# Patient Record
Sex: MALE | Race: WHITE | NOT HISPANIC OR LATINO | Employment: OTHER | URBAN - METROPOLITAN AREA
[De-identification: names, ages, dates, MRNs, and addresses within clinical notes are randomized per-mention and may not be internally consistent; named-entity substitution may affect disease eponyms.]

---

## 2017-04-04 DIAGNOSIS — I25.10 ATHEROSCLEROSIS OF NATIVE CORONARY ARTERY OF NATIVE HEART WITHOUT ANGINA PECTORIS: ICD-10-CM

## 2017-04-04 RX ORDER — FUROSEMIDE 40 MG/1
40 TABLET ORAL DAILY
Qty: 90 TAB | Refills: 2 | Status: SHIPPED | OUTPATIENT
Start: 2017-04-04 | End: 2018-06-15 | Stop reason: SDUPTHER

## 2017-04-11 DIAGNOSIS — E78.5 HYPERLIPIDEMIA, UNSPECIFIED HYPERLIPIDEMIA TYPE: ICD-10-CM

## 2017-04-11 RX ORDER — SIMVASTATIN 20 MG
20 TABLET ORAL EVERY EVENING
Qty: 90 TAB | Refills: 2 | Status: SHIPPED | OUTPATIENT
Start: 2017-04-11 | End: 2017-10-16 | Stop reason: SDUPTHER

## 2017-04-12 ENCOUNTER — TELEPHONE (OUTPATIENT)
Dept: CARDIOLOGY | Facility: MEDICAL CENTER | Age: 82
End: 2017-04-12

## 2017-05-18 ENCOUNTER — HOSPITAL ENCOUNTER (OUTPATIENT)
Dept: LAB | Facility: MEDICAL CENTER | Age: 82
End: 2017-05-18
Attending: INTERNAL MEDICINE
Payer: MEDICARE

## 2017-05-18 DIAGNOSIS — Z79.899 HIGH RISK MEDICATION USE: ICD-10-CM

## 2017-05-18 DIAGNOSIS — N18.4 CKD (CHRONIC KIDNEY DISEASE), STAGE 4 (SEVERE): ICD-10-CM

## 2017-05-18 LAB
ANION GAP SERPL CALC-SCNC: 8 MMOL/L (ref 0–11.9)
BUN SERPL-MCNC: 33 MG/DL (ref 8–22)
CALCIUM SERPL-MCNC: 9.5 MG/DL (ref 8.5–10.5)
CHLORIDE SERPL-SCNC: 105 MMOL/L (ref 96–112)
CO2 SERPL-SCNC: 24 MMOL/L (ref 20–33)
CREAT SERPL-MCNC: 1.6 MG/DL (ref 0.5–1.4)
GFR SERPL CREATININE-BSD FRML MDRD: 41 ML/MIN/1.73 M 2
GLUCOSE SERPL-MCNC: 158 MG/DL (ref 65–99)
POTASSIUM SERPL-SCNC: 4.6 MMOL/L (ref 3.6–5.5)
SODIUM SERPL-SCNC: 137 MMOL/L (ref 135–145)

## 2017-05-18 PROCEDURE — 80048 BASIC METABOLIC PNL TOTAL CA: CPT

## 2017-05-18 PROCEDURE — 36415 COLL VENOUS BLD VENIPUNCTURE: CPT

## 2017-05-19 ENCOUNTER — TELEPHONE (OUTPATIENT)
Dept: CARDIOLOGY | Facility: MEDICAL CENTER | Age: 82
End: 2017-05-19

## 2017-05-19 NOTE — TELEPHONE ENCOUNTER
Called patient and advised him of stable lab results. Patient is thankful for the good news.    ETELVINA AN

## 2017-05-19 NOTE — TELEPHONE ENCOUNTER
----- Message from Demarco Ochoa M.D. sent at 5/19/2017 12:56 PM PDT -----  Labs look stable, please let him know     Thank you

## 2017-06-02 ENCOUNTER — OFFICE VISIT (OUTPATIENT)
Dept: CARDIOLOGY | Facility: PHYSICIAN GROUP | Age: 82
End: 2017-06-02
Payer: MEDICARE

## 2017-06-02 VITALS
DIASTOLIC BLOOD PRESSURE: 66 MMHG | HEIGHT: 65 IN | BODY MASS INDEX: 27.99 KG/M2 | SYSTOLIC BLOOD PRESSURE: 118 MMHG | OXYGEN SATURATION: 95 % | WEIGHT: 168 LBS | HEART RATE: 65 BPM

## 2017-06-02 DIAGNOSIS — I50.42 CHF (CONGESTIVE HEART FAILURE), NYHA CLASS II, CHRONIC, COMBINED (HCC): ICD-10-CM

## 2017-06-02 DIAGNOSIS — I25.83 CORONARY ARTERY DISEASE DUE TO LIPID RICH PLAQUE: ICD-10-CM

## 2017-06-02 DIAGNOSIS — I50.22 CHRONIC SYSTOLIC CONGESTIVE HEART FAILURE (HCC): Chronic | ICD-10-CM

## 2017-06-02 DIAGNOSIS — Z98.890 S/P CARDIAC CATHETERIZATION: Chronic | ICD-10-CM

## 2017-06-02 DIAGNOSIS — I25.2 OLD MYOCARDIAL INFARCTION: Chronic | ICD-10-CM

## 2017-06-02 DIAGNOSIS — I25.10 CORONARY ARTERY DISEASE DUE TO LIPID RICH PLAQUE: ICD-10-CM

## 2017-06-02 PROCEDURE — G8432 DEP SCR NOT DOC, RNG: HCPCS | Performed by: INTERNAL MEDICINE

## 2017-06-02 PROCEDURE — 1036F TOBACCO NON-USER: CPT | Performed by: INTERNAL MEDICINE

## 2017-06-02 PROCEDURE — G8598 ASA/ANTIPLAT THER USED: HCPCS | Performed by: INTERNAL MEDICINE

## 2017-06-02 PROCEDURE — G8419 CALC BMI OUT NRM PARAM NOF/U: HCPCS | Performed by: INTERNAL MEDICINE

## 2017-06-02 PROCEDURE — 99214 OFFICE O/P EST MOD 30 MIN: CPT | Performed by: INTERNAL MEDICINE

## 2017-06-02 PROCEDURE — 1101F PT FALLS ASSESS-DOCD LE1/YR: CPT | Mod: 8P | Performed by: INTERNAL MEDICINE

## 2017-06-02 PROCEDURE — 4040F PNEUMOC VAC/ADMIN/RCVD: CPT | Mod: 8P | Performed by: INTERNAL MEDICINE

## 2017-06-02 RX ORDER — CARVEDILOL 6.25 MG/1
6.25 TABLET ORAL 2 TIMES DAILY WITH MEALS
Qty: 180 TAB | Refills: 3 | Status: SHIPPED | OUTPATIENT
Start: 2017-06-02 | End: 2017-09-05 | Stop reason: SDUPTHER

## 2017-06-02 ASSESSMENT — ENCOUNTER SYMPTOMS
COUGH: 0
SORE THROAT: 0
PND: 0
FOCAL WEAKNESS: 0
PALPITATIONS: 0
CLAUDICATION: 0
SHORTNESS OF BREATH: 0
BACK PAIN: 1
HEARTBURN: 1
NAUSEA: 0
HEADACHES: 0
FEVER: 0
DIZZINESS: 0
BRUISES/BLEEDS EASILY: 0
ABDOMINAL PAIN: 0
LOSS OF CONSCIOUSNESS: 0
CHILLS: 0
WEAKNESS: 0
FALLS: 0
BLURRED VISION: 0

## 2017-06-03 NOTE — PROGRESS NOTES
Subjective:   Mansoor Case is a 86 y.o. male who presents today for follow-up of his history of coronary disease with ischemic cardiomyopathy and reduced ejection fraction    He has been doing okay he worries that the carvedilol contributes to dyspepsia    Past Medical History   Diagnosis Date   • Arthropathy 4/5/2012   • CAD (coronary artery disease), native coronary artery 4/5/2012   • Dyslipidemia 4/5/2012   • Old myocardial infarction 4/5/2012   • CHF (congestive heart failure) (CMS-HCC) 4/5/2012   • Renal insufficiency 4/5/2012   • S/P cardiac catheterization    • Chronic systolic congestive heart failure (HCC) - ischemic myopathy with heart attack in 2003 and 2015 with an ejection fraction of 15-20% 4/5/2012            History reviewed. No pertinent past surgical history.  History reviewed. No pertinent family history.  History   Smoking status   • Former Smoker   • Quit date: 01/01/2003   Smokeless tobacco   • Never Used     No Known Allergies  Outpatient Encounter Prescriptions as of 6/2/2017   Medication Sig Dispense Refill   • carvedilol (COREG) 6.25 MG Tab Take 1 Tab by mouth 2 times a day, with meals. 180 Tab 3   • simvastatin (ZOCOR) 20 MG Tab Take 1 Tab by mouth every evening. 90 Tab 2   • furosemide (LASIX) 40 MG Tab Take 1 Tab by mouth every day. 90 Tab 2   • aspirin EC (ECOTRIN) 81 MG Tablet Delayed Response Take 81 mg by mouth every day.     • spironolactone (ALDACTONE) 25 MG Tab Take 1 Tab by mouth every day. (Patient taking differently: Take 25 mg by mouth every day. Alternating every other day with Lasix) 90 Tab 3   • potassium chloride ER (KLOR-CON) 10 MEQ tablet Take 1 Tab by mouth every day. (Patient taking differently: Take 10 mEq by mouth. Take with Lasix every other day) 90 Tab 3   • lisinopril (PRINIVIL) 10 MG Tab Take 1 Tab by mouth every day. 90 Tab 3   • nitroglycerin (NITROSTAT) 0.4 MG SL Tab Place 1 Tab under tongue as needed for Chest Pain. every 5 minutes for up to 3 doses, call 911  "if pain persists. 25 Tab 3   • [DISCONTINUED] carvedilol (COREG) 12.5 MG Tab Take 1 Tab by mouth 2 times a day, with meals. 180 Tab 3   • [DISCONTINUED] apixaban (ELIQUIS) 2.5mg Tab Take 2.5 mg by mouth 2 Times a Day.       No facility-administered encounter medications on file as of 6/2/2017.     Review of Systems   Constitutional: Negative for fever and chills.   HENT: Negative for sore throat.    Eyes: Negative for blurred vision.   Respiratory: Negative for cough and shortness of breath.    Cardiovascular: Negative for chest pain, palpitations, claudication, leg swelling and PND.   Gastrointestinal: Positive for heartburn. Negative for nausea and abdominal pain.   Musculoskeletal: Positive for back pain and joint pain. Negative for falls.   Skin: Negative for rash.   Neurological: Negative for dizziness, focal weakness, loss of consciousness, weakness and headaches.   Endo/Heme/Allergies: Does not bruise/bleed easily.        Objective:   /66 mmHg  Pulse 65  Ht 1.651 m (5' 5\")  Wt 76.204 kg (168 lb)  BMI 27.96 kg/m2  SpO2 95%    Physical Exam   Constitutional: No distress.   HENT:   Mouth/Throat: Oropharynx is clear and moist.   Eyes: No scleral icterus.   Neck: Neck supple. No JVD present.   Cardiovascular: Normal rate, regular rhythm, normal heart sounds and intact distal pulses.  Exam reveals no gallop and no friction rub.    No murmur heard.  Pulmonary/Chest: Effort normal. He has no rales.   Abdominal: Soft. Bowel sounds are normal. There is no tenderness.   Musculoskeletal: He exhibits no edema.   Neurological: He is alert.   Skin: No rash noted. He is not diaphoretic.   Psychiatric: He has a normal mood and affect.     Recent lab work looks great with mild chronic kidney disease    Assessment:     1. Old myocardial infarction - 2003 likely RCA, 2016 NSTEMI      2. Coronary artery disease due to lipid rich plaque     3. S/P cardiac catheterization - 5/2016 in setting of acute MI with LBBB found " to have RCA  and mild disease in LAD and LCx     4. Chronic systolic congestive heart failure (HCC) - ischemic myopathy with heart attack in 2003 and 2016 with an ejection fraction of 15-20%     5. CHF (congestive heart failure), NYHA class II, chronic, combined (CMS-HCC)  carvedilol (COREG) 6.25 MG Tab       Medical Decision Making:  Today's Assessment / Status / Plan:     It was my pleasure to meet with Mr. Dow.    We will try to reduce his carvedilol to see if this helps with his dyspepsia not and he should continue on 6.25 twice a day    I also counseled him on appropriate diet for her gastroesophageal reflux disease    I will see Mr. Dow back in 1 year time and encouraged him to follow up with us over the phone or e-mail using my MyChart as issues arise.    It is my pleasure to participate in the care of Mr. Dow.  Please do not hesitate to contact me with questions or concerns.    Demarco Ochoa MD PhD FAC  Cardiologist [facility]

## 2017-07-05 DIAGNOSIS — I50.32 CHRONIC DIASTOLIC CONGESTIVE HEART FAILURE (HCC): ICD-10-CM

## 2017-07-06 RX ORDER — POTASSIUM CHLORIDE 750 MG/1
10 TABLET, FILM COATED, EXTENDED RELEASE ORAL DAILY
Qty: 90 TAB | Refills: 3 | OUTPATIENT
Start: 2017-07-06 | End: 2018-06-15 | Stop reason: SDUPTHER

## 2017-09-05 ENCOUNTER — TELEPHONE (OUTPATIENT)
Dept: CARDIOLOGY | Facility: MEDICAL CENTER | Age: 82
End: 2017-09-05

## 2017-09-05 DIAGNOSIS — I50.42 CHF (CONGESTIVE HEART FAILURE), NYHA CLASS II, CHRONIC, COMBINED (HCC): ICD-10-CM

## 2017-09-05 RX ORDER — LISINOPRIL 5 MG/1
5 TABLET ORAL DAILY
Qty: 90 TAB | Refills: 3 | Status: SHIPPED | OUTPATIENT
Start: 2017-09-05 | End: 2018-05-29 | Stop reason: SDUPTHER

## 2017-09-05 RX ORDER — CARVEDILOL 3.12 MG/1
3.12 TABLET ORAL 2 TIMES DAILY WITH MEALS
Qty: 180 TAB | Refills: 3 | Status: SHIPPED | OUTPATIENT
Start: 2017-09-05 | End: 2018-06-15 | Stop reason: SDUPTHER

## 2017-09-05 NOTE — TELEPHONE ENCOUNTER
Annette Ko R.N.   Phone Number: [phone]             CW/casey     Pt calling to report he had a 4th heart attack 9/1 and was hospitalized at [facility].  Pt wants to talk with CW about it.   Please have CW call pt at [phone].      Spoke with pt, pt reports was just recently admitted in [facility] for SOB, he was there x 4days and upon discharge they adjusted his meds as follows:      Coreg 3.125mg BID    Lasix 40mg take 1 1/2 tab    Lisinopril 5mg    Aspirin 325mg  Pt reports BP went down to 70s range while in the hospital but has been in the 120s since then  Pt requesting to talk directly to MD Whitfield and to hear from him directly. Pt don't want to set up Follow up but just wants to talk to him    Forwarded to MD Whitfield

## 2017-09-06 NOTE — TELEPHONE ENCOUNTER
Called pt he reports in addition to [facility] for dyspnea likely felt to have heart failure he believes according to this skeletally thought he was 5 pounds up on his home scale he doesn't believe that he really is.  He seems overall to reduce his carvedilol and lisinopril and increased his Lasix to 60 mg daily, on review he had not been taking 40 mg every day    Carvedilol 3.125 BID  Lisinopril 5 daily   Lasix 40 mg daily    Aspirin 81  Spironolactone 25  Weight at home 160-162    Lab work in a week    It is my pleasure to participate in the care of Mr. Dow.  Please do not hesitate to contact me with questions or concerns.    Demarco Ochoa MD PhD FACC  Cardiologist [facility]

## 2017-09-11 DIAGNOSIS — I50.42 CHF (CONGESTIVE HEART FAILURE), NYHA CLASS II, CHRONIC, COMBINED (HCC): ICD-10-CM

## 2017-09-12 RX ORDER — SPIRONOLACTONE 25 MG/1
25 TABLET ORAL DAILY
Qty: 90 TAB | Refills: 3 | Status: SHIPPED | OUTPATIENT
Start: 2017-09-12 | End: 2017-09-23

## 2017-09-22 ENCOUNTER — HOSPITAL ENCOUNTER (OUTPATIENT)
Dept: LAB | Facility: MEDICAL CENTER | Age: 82
End: 2017-09-22
Attending: INTERNAL MEDICINE
Payer: MEDICARE

## 2017-09-22 LAB
ANION GAP SERPL CALC-SCNC: 11 MMOL/L (ref 0–11.9)
BUN SERPL-MCNC: 59 MG/DL (ref 8–22)
CALCIUM SERPL-MCNC: 9.6 MG/DL (ref 8.5–10.5)
CHLORIDE SERPL-SCNC: 101 MMOL/L (ref 96–112)
CO2 SERPL-SCNC: 22 MMOL/L (ref 20–33)
CREAT SERPL-MCNC: 2.28 MG/DL (ref 0.5–1.4)
GFR SERPL CREATININE-BSD FRML MDRD: 27 ML/MIN/1.73 M 2
GLUCOSE SERPL-MCNC: 134 MG/DL (ref 65–99)
POTASSIUM SERPL-SCNC: 4.8 MMOL/L (ref 3.6–5.5)
SODIUM SERPL-SCNC: 134 MMOL/L (ref 135–145)

## 2017-09-22 PROCEDURE — 80048 BASIC METABOLIC PNL TOTAL CA: CPT

## 2017-09-22 PROCEDURE — 36415 COLL VENOUS BLD VENIPUNCTURE: CPT

## 2017-09-25 ENCOUNTER — TELEPHONE (OUTPATIENT)
Dept: CARDIOLOGY | Facility: MEDICAL CENTER | Age: 82
End: 2017-09-25

## 2017-09-25 DIAGNOSIS — N28.9 RENAL INSUFFICIENCY: Chronic | ICD-10-CM

## 2017-09-25 DIAGNOSIS — I50.22 CHRONIC SYSTOLIC CONGESTIVE HEART FAILURE (HCC): Chronic | ICD-10-CM

## 2017-09-25 NOTE — TELEPHONE ENCOUNTER
Pt notified of message by phone. Pt verbalizes understanding of instructions. Will do lab in a week.   Carmen DONOVAN RN     ----- Message from Demarco Ochoa M.D. sent at 9/23/2017  1:41 PM PDT -----  His kidney number increased he has to stop spironolactone and recheck non fasting in a week, if not improved he should meet with a nephrologist    Thank you

## 2017-09-28 ENCOUNTER — TELEPHONE (OUTPATIENT)
Dept: CARDIOLOGY | Facility: MEDICAL CENTER | Age: 82
End: 2017-09-28

## 2017-09-28 NOTE — TELEPHONE ENCOUNTER
----- Message from Annette Durant sent at 9/28/2017 11:26 AM PDT -----  Regarding: flu shot  Contact: [phone]  CHENCHO/sheri    Pt calling to ask for CW's approval to receive a flu shot in view of his recent heart attack, etc.  Please call Mansoor at

## 2017-10-02 ENCOUNTER — HOSPITAL ENCOUNTER (OUTPATIENT)
Dept: LAB | Facility: MEDICAL CENTER | Age: 82
End: 2017-10-02
Attending: INTERNAL MEDICINE
Payer: MEDICARE

## 2017-10-02 DIAGNOSIS — I50.22 CHRONIC SYSTOLIC CONGESTIVE HEART FAILURE (HCC): Chronic | ICD-10-CM

## 2017-10-02 DIAGNOSIS — N28.9 RENAL INSUFFICIENCY: Chronic | ICD-10-CM

## 2017-10-02 LAB
ANION GAP SERPL CALC-SCNC: 9 MMOL/L (ref 0–11.9)
BUN SERPL-MCNC: 39 MG/DL (ref 8–22)
CALCIUM SERPL-MCNC: 9.9 MG/DL (ref 8.5–10.5)
CHLORIDE SERPL-SCNC: 103 MMOL/L (ref 96–112)
CO2 SERPL-SCNC: 23 MMOL/L (ref 20–33)
CREAT SERPL-MCNC: 1.57 MG/DL (ref 0.5–1.4)
GFR SERPL CREATININE-BSD FRML MDRD: 42 ML/MIN/1.73 M 2
GLUCOSE SERPL-MCNC: 81 MG/DL (ref 65–99)
POTASSIUM SERPL-SCNC: 4.9 MMOL/L (ref 3.6–5.5)
SODIUM SERPL-SCNC: 135 MMOL/L (ref 135–145)

## 2017-10-02 PROCEDURE — 36415 COLL VENOUS BLD VENIPUNCTURE: CPT

## 2017-10-02 PROCEDURE — 80048 BASIC METABOLIC PNL TOTAL CA: CPT

## 2017-10-04 ENCOUNTER — TELEPHONE (OUTPATIENT)
Dept: CARDIOLOGY | Facility: MEDICAL CENTER | Age: 82
End: 2017-10-04

## 2017-10-04 NOTE — TELEPHONE ENCOUNTER
----- Message from Demarco Ochoa M.D. sent at 10/4/2017  3:54 PM PDT -----  Good news back to an acceptable range on the kidneys, hold aldactone indefinite    Thank you

## 2017-10-16 DIAGNOSIS — E78.5 HYPERLIPIDEMIA, UNSPECIFIED HYPERLIPIDEMIA TYPE: ICD-10-CM

## 2017-10-16 RX ORDER — SIMVASTATIN 20 MG
20 TABLET ORAL EVERY EVENING
Qty: 90 TAB | Refills: 3 | Status: SHIPPED | OUTPATIENT
Start: 2017-10-16 | End: 2018-06-15 | Stop reason: SDUPTHER

## 2018-01-29 ENCOUNTER — TELEPHONE (OUTPATIENT)
Dept: CARDIOLOGY | Facility: MEDICAL CENTER | Age: 83
End: 2018-01-29

## 2018-01-29 NOTE — TELEPHONE ENCOUNTER
"Pt advised to discuss with PCP.  Pt states, \"I don't have one and I'm not getting one.\" Advised pt would recommend that he is up to date with his vaccinations.  "

## 2018-01-29 NOTE — TELEPHONE ENCOUNTER
----- Message from Annette Durant sent at 1/29/2018  1:24 PM PST -----  Regarding: pneumonia shot advice  Contact: [phone]  CHENCHO/valencia    Pt calling to ask if he should get a pneumonia shot.    Please call

## 2018-06-15 ENCOUNTER — OFFICE VISIT (OUTPATIENT)
Dept: CARDIOLOGY | Facility: PHYSICIAN GROUP | Age: 83
End: 2018-06-15
Payer: MEDICARE

## 2018-06-15 VITALS
OXYGEN SATURATION: 96 % | HEIGHT: 65 IN | BODY MASS INDEX: 26.33 KG/M2 | HEART RATE: 84 BPM | DIASTOLIC BLOOD PRESSURE: 60 MMHG | WEIGHT: 158 LBS | SYSTOLIC BLOOD PRESSURE: 130 MMHG

## 2018-06-15 DIAGNOSIS — Z98.890 S/P CARDIAC CATHETERIZATION: Chronic | ICD-10-CM

## 2018-06-15 DIAGNOSIS — I10 ESSENTIAL HYPERTENSION, BENIGN: ICD-10-CM

## 2018-06-15 DIAGNOSIS — N28.9 RENAL INSUFFICIENCY: Chronic | ICD-10-CM

## 2018-06-15 DIAGNOSIS — I25.83 CORONARY ARTERY DISEASE DUE TO LIPID RICH PLAQUE: ICD-10-CM

## 2018-06-15 DIAGNOSIS — I50.22 CHRONIC SYSTOLIC CONGESTIVE HEART FAILURE (HCC): Chronic | ICD-10-CM

## 2018-06-15 DIAGNOSIS — I25.10 CORONARY ARTERY DISEASE DUE TO LIPID RICH PLAQUE: ICD-10-CM

## 2018-06-15 DIAGNOSIS — I25.10 ATHEROSCLEROSIS OF NATIVE CORONARY ARTERY OF NATIVE HEART WITHOUT ANGINA PECTORIS: ICD-10-CM

## 2018-06-15 DIAGNOSIS — E78.5 HYPERLIPIDEMIA, UNSPECIFIED HYPERLIPIDEMIA TYPE: ICD-10-CM

## 2018-06-15 DIAGNOSIS — I25.2 OLD MYOCARDIAL INFARCTION: Chronic | ICD-10-CM

## 2018-06-15 DIAGNOSIS — I50.42 CHF (CONGESTIVE HEART FAILURE), NYHA CLASS II, CHRONIC, COMBINED (HCC): ICD-10-CM

## 2018-06-15 PROCEDURE — 99214 OFFICE O/P EST MOD 30 MIN: CPT | Performed by: INTERNAL MEDICINE

## 2018-06-15 RX ORDER — SPIRONOLACTONE 25 MG/1
25 TABLET ORAL DAILY
COMMUNITY
End: 2018-06-15 | Stop reason: SDUPTHER

## 2018-06-15 RX ORDER — CARVEDILOL 3.12 MG/1
3.12 TABLET ORAL 2 TIMES DAILY WITH MEALS
Qty: 180 TAB | Refills: 3 | Status: SHIPPED | OUTPATIENT
Start: 2018-06-15 | End: 2019-05-30 | Stop reason: SDUPTHER

## 2018-06-15 RX ORDER — LISINOPRIL 10 MG/1
10 TABLET ORAL DAILY
Qty: 90 TAB | Refills: 3 | Status: SHIPPED | OUTPATIENT
Start: 2018-06-15 | End: 2019-05-30 | Stop reason: SDUPTHER

## 2018-06-15 RX ORDER — FUROSEMIDE 40 MG/1
40 TABLET ORAL DAILY
Qty: 90 TAB | Refills: 3 | Status: SHIPPED | OUTPATIENT
Start: 2018-06-15 | End: 2019-03-18 | Stop reason: SDUPTHER

## 2018-06-15 RX ORDER — SIMVASTATIN 20 MG
20 TABLET ORAL EVERY EVENING
Qty: 90 TAB | Refills: 3 | Status: SHIPPED | OUTPATIENT
Start: 2018-06-15 | End: 2019-05-30 | Stop reason: SDUPTHER

## 2018-06-15 RX ORDER — SPIRONOLACTONE 25 MG/1
12.5 TABLET ORAL DAILY
Qty: 45 TAB | Refills: 3 | Status: SHIPPED | OUTPATIENT
Start: 2018-06-15 | End: 2019-05-30 | Stop reason: SDUPTHER

## 2018-06-15 RX ORDER — POTASSIUM CHLORIDE 750 MG/1
10 TABLET, FILM COATED, EXTENDED RELEASE ORAL DAILY
Qty: 90 TAB | Refills: 3 | Status: SHIPPED | OUTPATIENT
Start: 2018-06-15 | End: 2019-05-30 | Stop reason: SDUPTHER

## 2018-06-15 ASSESSMENT — ENCOUNTER SYMPTOMS
BLURRED VISION: 0
FOCAL WEAKNESS: 0
FEVER: 0
COUGH: 0
ABDOMINAL PAIN: 0
WEAKNESS: 0
DIZZINESS: 0
PALPITATIONS: 0
SHORTNESS OF BREATH: 0
PND: 0
CLAUDICATION: 0
CHILLS: 0
NAUSEA: 0
FALLS: 0
SORE THROAT: 0
BRUISES/BLEEDS EASILY: 0

## 2018-06-15 NOTE — PROGRESS NOTES
Chief Complaint   Patient presents with   • Coronary Artery Disease     Follow up       Subjective:   Mansoor Case is a 87 y.o. male who presents today For follow-up of his history of current myopathy related to severely reduced ejection fraction prior coronary disease as well        He is doing okay from his cardiac status unfortunately suffers from significant arthritis especially in the left hip          Past Medical History:   Diagnosis Date   • Arthropathy 4/5/2012   • CAD (coronary artery disease), native coronary artery 4/5/2012   • CHF (congestive heart failure) ([facility]) 4/5/2012   • Chronic systolic congestive heart failure (HCC) - ischemic myopathy with heart attack in 2003 and 2015 with an ejection fraction of 15-20% 4/5/2012         • Dyslipidemia 4/5/2012   • Old myocardial infarction 4/5/2012   • Renal insufficiency 4/5/2012   • S/P cardiac catheterization      History reviewed. No pertinent surgical history.  History reviewed. No pertinent family history.  Social History     Social History   • Marital status:      Spouse name: N/A   • Number of children: N/A   • Years of education: N/A     Occupational History   • Not on file.     Social History Main Topics   • Smoking status: Former Smoker     Quit date: 1/1/2003   • Smokeless tobacco: Never Used   • Alcohol use Not on file   • Drug use: Unknown   • Sexual activity: Not on file     Other Topics Concern   • Not on file     Social History Narrative   • No narrative on file     No Known Allergies  Outpatient Encounter Prescriptions as of 6/15/2018   Medication Sig Dispense Refill   • spironolactone (ALDACTONE) 25 MG Tab Take 0.5 Tabs by mouth every day. 45 Tab 3   • carvedilol (COREG) 3.125 MG Tab Take 1 Tab by mouth 2 times a day, with meals. 180 Tab 3   • potassium chloride ER (KLOR-CON) 10 MEQ tablet Take 1 Tab by mouth every day. 90 Tab 3   • lisinopril (PRINIVIL) 10 MG Tab Take 1 Tab by mouth every day. 90 Tab 3   • furosemide (LASIX) 40 MG Tab  "Take 1 Tab by mouth every day. 90 Tab 3   • simvastatin (ZOCOR) 20 MG Tab Take 1 Tab by mouth every evening. 90 Tab 3   • aspirin EC (ECOTRIN) 81 MG Tablet Delayed Response Take 81 mg by mouth every day.     • [DISCONTINUED] spironolactone (ALDACTONE) 25 MG Tab Take 25 mg by mouth every day.     • lisinopril (PRINIVIL) 5 MG Tab TAKE 1 TABLET BY MOUTH DAILY 90 Tab 3   • [DISCONTINUED] simvastatin (ZOCOR) 20 MG Tab Take 1 Tab by mouth every evening. 90 Tab 3   • [DISCONTINUED] carvedilol (COREG) 3.125 MG Tab Take 1 Tab by mouth 2 times a day, with meals. 180 Tab 3   • [DISCONTINUED] potassium chloride ER (KLOR-CON) 10 MEQ tablet Take 1 Tab by mouth every day. 90 Tab 3   • [DISCONTINUED] lisinopril (PRINIVIL) 10 MG Tab TAKE 1 TABLET BY MOUTH DAILY 90 Tab 3   • [DISCONTINUED] furosemide (LASIX) 40 MG Tab Take 1 Tab by mouth every day. 90 Tab 2   • nitroglycerin (NITROSTAT) 0.4 MG SL Tab Place 1 Tab under tongue as needed for Chest Pain. every 5 minutes for up to 3 doses, call 911 if pain persists. 25 Tab 3     No facility-administered encounter medications on file as of 6/15/2018.      Review of Systems   Constitutional: Negative for chills and fever.   HENT: Negative for sore throat.    Eyes: Negative for blurred vision.   Respiratory: Negative for cough and shortness of breath.    Cardiovascular: Negative for chest pain, palpitations, claudication, leg swelling and PND.   Gastrointestinal: Negative for abdominal pain and nausea.   Musculoskeletal: Positive for joint pain. Negative for falls.   Skin: Negative for rash.   Neurological: Negative for dizziness, focal weakness and weakness.   Endo/Heme/Allergies: Does not bruise/bleed easily.        Objective:   /60   Pulse 84   Ht 1.651 m (5' 5\")   Wt 71.7 kg (158 lb)   SpO2 96%   BMI 26.29 kg/m²     Physical Exam   Constitutional: No distress.   HENT:   Mouth/Throat: Oropharynx is clear and moist. No oropharyngeal exudate.   Eyes: No scleral icterus.   Neck: " No JVD present.   Cardiovascular: Normal rate and normal heart sounds.  Exam reveals no gallop and no friction rub.    No murmur heard.  Pulmonary/Chest: No respiratory distress. He has no wheezes. He has no rales.   Abdominal: Soft. Bowel sounds are normal.   Musculoskeletal: He exhibits no edema.   Neurological: He is alert.   Skin: No rash noted. He is not diaphoretic.   Psychiatric: He has a normal mood and affect.       Assessment:     1. Old myocardial infarction - 2003 likely RCA, 2016 NSTEMI      2. Chronic systolic congestive heart failure (HCC) - ischemic myopathy with heart attack in 2003 and 2016 with an ejection fraction of 15-20%  CBC WITH DIFFERENTIAL    COMP METABOLIC PANEL   3. Renal insufficiency  CBC WITH DIFFERENTIAL    COMP METABOLIC PANEL   4. S/P cardiac catheterization - 5/2016 in setting of acute MI with LBBB found to have RCA  and mild disease in LAD and LCx  CBC WITH DIFFERENTIAL   5. Essential hypertension, benign  CBC WITH DIFFERENTIAL   6. Coronary artery disease due to lipid rich plaque  COMP METABOLIC PANEL   7. CHF (congestive heart failure), NYHA class II, chronic, combined ([facility])  carvedilol (COREG) 3.125 MG Tab   8. Atherosclerosis of native coronary artery of native heart without angina pectoris  furosemide (LASIX) 40 MG Tab    CBC WITH DIFFERENTIAL   9. Hyperlipidemia, unspecified hyperlipidemia type  simvastatin (ZOCOR) 20 MG Tab    CBC WITH DIFFERENTIAL       Medical Decision Making:  Today's Assessment / Status / Plan:     It was my pleasure to meet with Mr. Dow.    He is accompanied by his wife    He is tolerating his medications well we had advised him to stop his spironolactone last year given decline in his kidney function overall his GFR is around 30 and it was improved but I believe he just went back to taking spironolactone        We will update his labs as it has been 6 months for his chronic kidney disease and make sure he does not have anemia        Blood  pressure is well controlled he will takes prolactin 12.5 given his history of severely reduced ejection fraction given his overall outlook on life he declined further therapy such as ICD which is reasonable

## 2018-07-09 DIAGNOSIS — I25.10 ATHEROSCLEROSIS OF NATIVE CORONARY ARTERY OF NATIVE HEART WITHOUT ANGINA PECTORIS: Primary | ICD-10-CM

## 2018-07-20 ENCOUNTER — HOSPITAL ENCOUNTER (OUTPATIENT)
Dept: LAB | Facility: MEDICAL CENTER | Age: 83
End: 2018-07-20
Attending: INTERNAL MEDICINE
Payer: MEDICARE

## 2018-07-20 DIAGNOSIS — I50.42 CHF (CONGESTIVE HEART FAILURE), NYHA CLASS II, CHRONIC, COMBINED (HCC): ICD-10-CM

## 2018-07-20 DIAGNOSIS — I25.83 CORONARY ARTERY DISEASE DUE TO LIPID RICH PLAQUE: ICD-10-CM

## 2018-07-20 DIAGNOSIS — N28.9 RENAL INSUFFICIENCY: Chronic | ICD-10-CM

## 2018-07-20 DIAGNOSIS — I50.22 CHRONIC SYSTOLIC CONGESTIVE HEART FAILURE (HCC): Chronic | ICD-10-CM

## 2018-07-20 DIAGNOSIS — Z98.890 S/P CARDIAC CATHETERIZATION: Chronic | ICD-10-CM

## 2018-07-20 DIAGNOSIS — I25.10 CORONARY ARTERY DISEASE DUE TO LIPID RICH PLAQUE: ICD-10-CM

## 2018-07-20 DIAGNOSIS — I10 ESSENTIAL HYPERTENSION, BENIGN: ICD-10-CM

## 2018-07-20 DIAGNOSIS — E78.5 HYPERLIPIDEMIA, UNSPECIFIED HYPERLIPIDEMIA TYPE: ICD-10-CM

## 2018-07-20 DIAGNOSIS — I25.10 ATHEROSCLEROSIS OF NATIVE CORONARY ARTERY OF NATIVE HEART WITHOUT ANGINA PECTORIS: ICD-10-CM

## 2018-07-20 LAB
ALBUMIN SERPL BCP-MCNC: 4.1 G/DL (ref 3.2–4.9)
ALBUMIN/GLOB SERPL: 1.5 G/DL
ALP SERPL-CCNC: 60 U/L (ref 30–99)
ALT SERPL-CCNC: 11 U/L (ref 2–50)
ANION GAP SERPL CALC-SCNC: 9 MMOL/L (ref 0–11.9)
AST SERPL-CCNC: 15 U/L (ref 12–45)
BASOPHILS # BLD AUTO: 0.6 % (ref 0–1.8)
BASOPHILS # BLD: 0.04 K/UL (ref 0–0.12)
BILIRUB SERPL-MCNC: 0.8 MG/DL (ref 0.1–1.5)
BUN SERPL-MCNC: 39 MG/DL (ref 8–22)
CALCIUM SERPL-MCNC: 9.6 MG/DL (ref 8.5–10.5)
CHLORIDE SERPL-SCNC: 105 MMOL/L (ref 96–112)
CO2 SERPL-SCNC: 24 MMOL/L (ref 20–33)
CREAT SERPL-MCNC: 1.63 MG/DL (ref 0.5–1.4)
EOSINOPHIL # BLD AUTO: 0.26 K/UL (ref 0–0.51)
EOSINOPHIL NFR BLD: 3.7 % (ref 0–6.9)
ERYTHROCYTE [DISTWIDTH] IN BLOOD BY AUTOMATED COUNT: 45.6 FL (ref 35.9–50)
GLOBULIN SER CALC-MCNC: 2.7 G/DL (ref 1.9–3.5)
GLUCOSE SERPL-MCNC: 95 MG/DL (ref 65–99)
HCT VFR BLD AUTO: 44.1 % (ref 42–52)
HGB BLD-MCNC: 14.9 G/DL (ref 14–18)
IMM GRANULOCYTES # BLD AUTO: 0.01 K/UL (ref 0–0.11)
IMM GRANULOCYTES NFR BLD AUTO: 0.1 % (ref 0–0.9)
LYMPHOCYTES # BLD AUTO: 1.79 K/UL (ref 1–4.8)
LYMPHOCYTES NFR BLD: 25.3 % (ref 22–41)
MCH RBC QN AUTO: 31.3 PG (ref 27–33)
MCHC RBC AUTO-ENTMCNC: 33.8 G/DL (ref 33.7–35.3)
MCV RBC AUTO: 92.6 FL (ref 81.4–97.8)
MONOCYTES # BLD AUTO: 0.79 K/UL (ref 0–0.85)
MONOCYTES NFR BLD AUTO: 11.2 % (ref 0–13.4)
NEUTROPHILS # BLD AUTO: 4.18 K/UL (ref 1.82–7.42)
NEUTROPHILS NFR BLD: 59.1 % (ref 44–72)
NRBC # BLD AUTO: 0 K/UL
NRBC BLD-RTO: 0 /100 WBC
PLATELET # BLD AUTO: 197 K/UL (ref 164–446)
PMV BLD AUTO: 11.4 FL (ref 9–12.9)
POTASSIUM SERPL-SCNC: 4.6 MMOL/L (ref 3.6–5.5)
PROT SERPL-MCNC: 6.8 G/DL (ref 6–8.2)
RBC # BLD AUTO: 4.76 M/UL (ref 4.7–6.1)
SODIUM SERPL-SCNC: 138 MMOL/L (ref 135–145)
WBC # BLD AUTO: 7.1 K/UL (ref 4.8–10.8)

## 2018-07-20 PROCEDURE — 36415 COLL VENOUS BLD VENIPUNCTURE: CPT

## 2018-07-20 PROCEDURE — 85025 COMPLETE CBC W/AUTO DIFF WBC: CPT

## 2018-07-20 PROCEDURE — 80053 COMPREHEN METABOLIC PANEL: CPT

## 2019-03-18 ENCOUNTER — TELEPHONE (OUTPATIENT)
Dept: CARDIOLOGY | Facility: MEDICAL CENTER | Age: 84
End: 2019-03-18

## 2019-03-18 DIAGNOSIS — I25.10 ATHEROSCLEROSIS OF NATIVE CORONARY ARTERY OF NATIVE HEART WITHOUT ANGINA PECTORIS: ICD-10-CM

## 2019-03-18 NOTE — TELEPHONE ENCOUNTER
"trouble breathing & sleeping   Received: Today Message Contents   Annette Cheatham R.N.   Phone Number: [phone]          CW/donald     Pt calling to report trouble getting his breath for a few days.  Pt has not had any sleep last few nites.     Please call pt for more details & to advise, [phone]      Returned patient call.  Pt states he noticed 4-days ago he was experiencing SOB while talking to somebody and states, \"I would have to take a break from talking.  Last Thursday, I skipped my Lasix because my wife had a eye appointment, but I started taking my Lasix after.  I had been taking 40mg Lasix but the cardiologist told me to take 60mg because 40 was not enough.  I feel like I am getting better.  Another thing I am worried about is I am unable to sleep at night.  When I stretch out I get stomach discomfort, no pain, no bowel issues, just discomfort.  When I am sitting regularly, I have no problem.  Which is why I can drive.  I am not dizzy, no fever, no heart ache, and no are pain.  My BP: 139/71.  My feet and ankles are swollen but they have always been since my 4th heart attack.  I have been feeling better since Saturday.\"  Encourage patient elevate legs and use compression stockings.   He states he elevates his legs and will start using his compression stockings.  Pt states after Thursday he has been taking his prescribed medications.  Reassurance given to pt that concerns will be relayed to MD for advise.  He states no other concerns or questions at this time and is appreciative of information given.    Concerns relayed to MD for advise.  "

## 2019-03-18 NOTE — TELEPHONE ENCOUNTER
I agree with what he is done for the diuretics he would need to follow-up with primary care or urgent care on the stomach upset    Thank you

## 2019-03-18 NOTE — TELEPHONE ENCOUNTER
Returned patient call and reviewed MD recommendations.  He verbalizes understanding and is appreciative of information given.

## 2019-03-19 RX ORDER — FUROSEMIDE 40 MG/1
TABLET ORAL
Qty: 90 TAB | Refills: 1 | Status: SHIPPED | OUTPATIENT
Start: 2019-03-19 | End: 2019-05-30 | Stop reason: SDUPTHER

## 2019-05-30 ENCOUNTER — OFFICE VISIT (OUTPATIENT)
Dept: CARDIOLOGY | Facility: PHYSICIAN GROUP | Age: 84
End: 2019-05-30
Payer: MEDICARE

## 2019-05-30 VITALS
WEIGHT: 148 LBS | OXYGEN SATURATION: 95 % | HEIGHT: 65 IN | BODY MASS INDEX: 24.66 KG/M2 | HEART RATE: 84 BPM | DIASTOLIC BLOOD PRESSURE: 58 MMHG | SYSTOLIC BLOOD PRESSURE: 100 MMHG

## 2019-05-30 DIAGNOSIS — I25.10 CORONARY ARTERY DISEASE DUE TO LIPID RICH PLAQUE: ICD-10-CM

## 2019-05-30 DIAGNOSIS — I10 ESSENTIAL HYPERTENSION, BENIGN: ICD-10-CM

## 2019-05-30 DIAGNOSIS — I25.10 ATHEROSCLEROSIS OF NATIVE CORONARY ARTERY OF NATIVE HEART WITHOUT ANGINA PECTORIS: ICD-10-CM

## 2019-05-30 DIAGNOSIS — E78.5 DYSLIPIDEMIA: Chronic | ICD-10-CM

## 2019-05-30 DIAGNOSIS — I50.42 CHF (CONGESTIVE HEART FAILURE), NYHA CLASS II, CHRONIC, COMBINED (HCC): ICD-10-CM

## 2019-05-30 DIAGNOSIS — I25.83 CORONARY ARTERY DISEASE DUE TO LIPID RICH PLAQUE: ICD-10-CM

## 2019-05-30 DIAGNOSIS — I50.22 CHRONIC SYSTOLIC CONGESTIVE HEART FAILURE (HCC): Chronic | ICD-10-CM

## 2019-05-30 DIAGNOSIS — Z98.890 S/P CARDIAC CATHETERIZATION: Chronic | ICD-10-CM

## 2019-05-30 DIAGNOSIS — I25.2 OLD MYOCARDIAL INFARCTION: Chronic | ICD-10-CM

## 2019-05-30 DIAGNOSIS — E78.5 HYPERLIPIDEMIA, UNSPECIFIED HYPERLIPIDEMIA TYPE: ICD-10-CM

## 2019-05-30 PROCEDURE — 99214 OFFICE O/P EST MOD 30 MIN: CPT | Performed by: INTERNAL MEDICINE

## 2019-05-30 RX ORDER — LISINOPRIL 10 MG/1
10 TABLET ORAL DAILY
Qty: 90 TAB | Refills: 3 | Status: SHIPPED | OUTPATIENT
Start: 2019-05-30 | End: 2019-10-14 | Stop reason: SDUPTHER

## 2019-05-30 RX ORDER — POTASSIUM CHLORIDE 750 MG/1
10 TABLET, FILM COATED, EXTENDED RELEASE ORAL DAILY
Qty: 90 TAB | Refills: 3 | Status: SHIPPED | OUTPATIENT
Start: 2019-05-30

## 2019-05-30 RX ORDER — CARVEDILOL 3.12 MG/1
3.12 TABLET ORAL 2 TIMES DAILY WITH MEALS
Qty: 180 TAB | Refills: 3 | Status: SHIPPED | OUTPATIENT
Start: 2019-05-30

## 2019-05-30 RX ORDER — SPIRONOLACTONE 25 MG/1
12.5 TABLET ORAL DAILY
Qty: 45 TAB | Refills: 3 | Status: SHIPPED | OUTPATIENT
Start: 2019-05-30

## 2019-05-30 RX ORDER — FUROSEMIDE 40 MG/1
60 TABLET ORAL DAILY
Qty: 90 TAB | Refills: 1 | Status: SHIPPED | OUTPATIENT
Start: 2019-05-30

## 2019-05-30 RX ORDER — NITROGLYCERIN 0.4 MG/1
0.4 TABLET SUBLINGUAL PRN
Qty: 25 TAB | Refills: 3 | Status: SHIPPED | OUTPATIENT
Start: 2019-05-30

## 2019-05-30 RX ORDER — SIMVASTATIN 20 MG
20 TABLET ORAL EVERY EVENING
Qty: 90 TAB | Refills: 3 | Status: SHIPPED | OUTPATIENT
Start: 2019-05-30

## 2019-05-30 ASSESSMENT — ENCOUNTER SYMPTOMS
ABDOMINAL PAIN: 0
WEAKNESS: 0
FEVER: 0
DIZZINESS: 0
NAUSEA: 0
BRUISES/BLEEDS EASILY: 0
FALLS: 0
BLURRED VISION: 0
CLAUDICATION: 0
FOCAL WEAKNESS: 0
PND: 0
PALPITATIONS: 0
SORE THROAT: 0
SHORTNESS OF BREATH: 0
CHILLS: 0
COUGH: 0

## 2019-05-30 NOTE — PROGRESS NOTES
Chief Complaint   Patient presents with   • Coronary Artery Disease       Subjective:   Mansoor Case is a 88 y.o. male who presents today for follow-up of his ischemic cardiomyopathy with right chronic total occlusion    He is been doing well for the last year he does still have significant arthritis of his right hip he did call this couple months ago reporting need for increase Lasix to 60 mg daily helped he does have some chronic abdominal complaints but this is been stable as well    He continues to lose weight 10 pounds in the last year somewhat intentionally he also has low appetite at times    Past Medical History:   Diagnosis Date   • Arthropathy 4/5/2012   • CAD (coronary artery disease), native coronary artery 4/5/2012   • CHF (congestive heart failure) ([facility]) 4/5/2012   • Chronic systolic congestive heart failure (HCC) - ischemic myopathy with heart attack in 2003 and 2015 with an ejection fraction of 15-20% 4/5/2012         • Dyslipidemia 4/5/2012   • Old myocardial infarction 4/5/2012   • Renal insufficiency 4/5/2012   • S/P cardiac catheterization      No past surgical history on file.  No family history on file.  Social History     Social History   • Marital status:      Spouse name: N/A   • Number of children: N/A   • Years of education: N/A     Occupational History   • Not on file.     Social History Main Topics   • Smoking status: Former Smoker     Quit date: 1/1/2003   • Smokeless tobacco: Never Used   • Alcohol use Not on file   • Drug use: Unknown   • Sexual activity: Not on file     Other Topics Concern   • Not on file     Social History Narrative   • No narrative on file     No Known Allergies  Outpatient Encounter Prescriptions as of 5/30/2019   Medication Sig Dispense Refill   • furosemide (LASIX) 40 MG Tab Take 1.5 Tabs by mouth every day. TAKE 1 TABLET BY MOUTH DAILY 90 Tab 1   • spironolactone (ALDACTONE) 25 MG Tab Take 0.5 Tabs by mouth every day. 45 Tab 3   • carvedilol (COREG) 3.125  MG Tab Take 1 Tab by mouth 2 times a day, with meals. 180 Tab 3   • potassium chloride ER (KLOR-CON) 10 MEQ tablet Take 1 Tab by mouth every day. 90 Tab 3   • lisinopril (PRINIVIL) 10 MG Tab Take 1 Tab by mouth every day. 90 Tab 3   • simvastatin (ZOCOR) 20 MG Tab Take 1 Tab by mouth every evening. 90 Tab 3   • nitroglycerin (NITROSTAT) 0.4 MG SL Tab Place 1 Tab under tongue as needed for Chest Pain. every 5 minutes for up to 3 doses, call 911 if pain persists. 25 Tab 3   • aspirin EC (ECOTRIN) 81 MG Tablet Delayed Response Take 1 Tab by mouth every day. 120 Tab 1   • lisinopril (PRINIVIL) 5 MG Tab TAKE 1 TABLET BY MOUTH DAILY 90 Tab 3   • [DISCONTINUED] furosemide (LASIX) 40 MG Tab TAKE 1 TABLET BY MOUTH DAILY 90 Tab 1   • [DISCONTINUED] spironolactone (ALDACTONE) 25 MG Tab Take 0.5 Tabs by mouth every day. 45 Tab 3   • [DISCONTINUED] carvedilol (COREG) 3.125 MG Tab Take 1 Tab by mouth 2 times a day, with meals. 180 Tab 3   • [DISCONTINUED] potassium chloride ER (KLOR-CON) 10 MEQ tablet Take 1 Tab by mouth every day. 90 Tab 3   • [DISCONTINUED] lisinopril (PRINIVIL) 10 MG Tab Take 1 Tab by mouth every day. 90 Tab 3   • [DISCONTINUED] simvastatin (ZOCOR) 20 MG Tab Take 1 Tab by mouth every evening. 90 Tab 3   • [DISCONTINUED] nitroglycerin (NITROSTAT) 0.4 MG SL Tab Place 1 Tab under tongue as needed for Chest Pain. every 5 minutes for up to 3 doses, call 911 if pain persists. 25 Tab 3     No facility-administered encounter medications on file as of 5/30/2019.      Review of Systems   Constitutional: Negative for chills and fever.   HENT: Negative for sore throat.    Eyes: Negative for blurred vision.   Respiratory: Negative for cough and shortness of breath.    Cardiovascular: Negative for chest pain, palpitations, claudication, leg swelling and PND.   Gastrointestinal: Negative for abdominal pain and nausea.   Musculoskeletal: Negative for falls and joint pain.   Skin: Negative for rash.   Neurological: Negative  "for dizziness, focal weakness and weakness.   Endo/Heme/Allergies: Does not bruise/bleed easily.        Objective:   /58 (BP Location: Left arm, Patient Position: Sitting, BP Cuff Size: Adult)   Pulse 84   Ht 1.651 m (5' 5\")   Wt 67.1 kg (148 lb)   SpO2 95%   BMI 24.63 kg/m²     Physical Exam   Constitutional: No distress.   HENT:   Mouth/Throat: Oropharynx is clear and moist. No oropharyngeal exudate.   Eyes: No scleral icterus.   Neck: No JVD present.   Cardiovascular: Normal rate and normal heart sounds.  Exam reveals no gallop and no friction rub.    No murmur heard.  Pulmonary/Chest: No respiratory distress. He has no wheezes. He has no rales.   Abdominal: Soft. Bowel sounds are normal.   Musculoskeletal: He exhibits no edema.   Neurological: He is alert.   Skin: No rash noted. He is not diaphoretic.   Psychiatric: He has a normal mood and affect.       Assessment:     1. Chronic systolic congestive heart failure (HCC) - ischemic myopathy with heart attack in 2003 and 2016 with an ejection fraction of 15-20%  Comp Metabolic Panel   2. Coronary artery disease due to lipid rich plaque  Comp Metabolic Panel   3. Dyslipidemia     4. Essential hypertension, benign  Comp Metabolic Panel   5. Old myocardial infarction - 2003 likely RCA, 2016 NSTEMI      6. S/P cardiac catheterization - 5/2016 in setting of acute MI with LBBB found to have RCA  and mild disease in LAD and LCx     7. Atherosclerosis of native coronary artery of native heart without angina pectoris  furosemide (LASIX) 40 MG Tab    nitroglycerin (NITROSTAT) 0.4 MG SL Tab   8. CHF (congestive heart failure), NYHA class II, chronic, combined (HCC)  carvedilol (COREG) 3.125 MG Tab   9. Hyperlipidemia, unspecified hyperlipidemia type  simvastatin (ZOCOR) 20 MG Tab       Medical Decision Making:  Today's Assessment / Status / Plan:     It was my pleasure to meet with Mr. Dow.    Blood pressure is well controlled.      He is on appropriate " statin.    He will update his labs    He will keep an eye on his weight monitor for fluid retention certainly could take an extra half dose as necessary    I will see Mr. Dow back in 1 year time and encouraged him to follow up with us over the phone or e-mail using my MyChart as issues arise.    It is my pleasure to participate in the care of Mr. Dow.  Please do not hesitate to contact me with questions or concerns.    Demarco Ochoa MD PhD [facility]  Cardiologist [facility]    Please note that this dictation was created using voice recognition software. I have worked with consultants from the vendor as well as technical experts from [facility] to optimize the interface. I have made every reasonable attempt to correct obvious errors, but I expect that there are errors of grammar and possibly content I did not discover before finalizing the note.

## 2019-06-17 ENCOUNTER — TELEPHONE (OUTPATIENT)
Dept: CARDIOLOGY | Facility: MEDICAL CENTER | Age: 84
End: 2019-06-17

## 2019-06-17 NOTE — TELEPHONE ENCOUNTER
script clarification   Received: Today Message Contents   Annette Cheatham R.N.   Phone Number: [phone]     CW/donald Ambriz from [facility] calling for clarification of directions on furosemide   Please call Katina .      Returned patient call and reviewed concerns.  She states they have received electronic clarification today regarding Furosemide and states no other concerns or questions at this time.  She is appreciative of information given.

## 2019-10-01 ENCOUNTER — TELEPHONE (OUTPATIENT)
Dept: CARDIOLOGY | Facility: MEDICAL CENTER | Age: 84
End: 2019-10-01

## 2019-10-01 NOTE — TELEPHONE ENCOUNTER
DM paperwork received from [facility] requesting for completion and to call pt son, Amor Dow, [phone].  Paperwork completed and signature obtained from MD.  Paperwork faxed to [facility], [phone], completed status.    Called Amor, unable to reach.  Left detailed voicemail regarding update and to return this call with decision if he would like to  paperwork or if he would like paperwork mailed.    Paperwork placed in folder for safe keeping.  Will await for phone call to be returned.

## 2019-10-14 DIAGNOSIS — I10 ESSENTIAL HYPERTENSION: ICD-10-CM

## 2019-10-15 RX ORDER — LISINOPRIL 10 MG/1
10 TABLET ORAL DAILY
Qty: 90 TAB | Refills: 3 | Status: SHIPPED | OUTPATIENT
Start: 2019-10-15

## 2019-10-29 DIAGNOSIS — I25.10 ATHEROSCLEROSIS OF NATIVE CORONARY ARTERY OF NATIVE HEART WITHOUT ANGINA PECTORIS: ICD-10-CM

## 2019-10-29 RX ORDER — FUROSEMIDE 40 MG/1
TABLET ORAL
Qty: 90 TAB | Refills: 3 | Status: SHIPPED | OUTPATIENT
Start: 2019-10-29

## 2019-10-29 RX ORDER — LISINOPRIL 10 MG/1
TABLET ORAL
Refills: 3 | OUTPATIENT
Start: 2019-10-29

## 2020-01-03 ENCOUNTER — TELEPHONE (OUTPATIENT)
Dept: CARDIOLOGY | Facility: MEDICAL CENTER | Age: 85
End: 2020-01-03

## 2020-01-03 NOTE — TELEPHONE ENCOUNTER
FW: Edit   Received: 1 week ago    Schedule follow-up appointment Message Contents   Demarco Ochoa M.D.  Bonny Cheatham R.N.     Let him know I looked over his records, he will need to get established with Karen in [place] for [employer] pacer checks please make sure this is arranged.     Thanks      Called pt and spoke with wife, Hina, and reviewed MD recommendations.  She states pt is currently in rehab getting his strength back.  She states pt will be there for awhile and will not likely make it to his FV with MD on 02/06/2020.  Offered phone number [phone] to Hina to schedule FV and pacer check in [place].  She verbalizes understanding and states no other concerns or questions at this time.  Pt is appreciative of information given.    Pt update relayed to schedulers to cancel upcoming FV.

## 2020-01-06 ENCOUNTER — TELEPHONE (OUTPATIENT)
Dept: CARDIOLOGY | Facility: MEDICAL CENTER | Age: 85
End: 2020-01-06

## 2020-01-06 NOTE — TELEPHONE ENCOUNTER
CW/donald    Pt's son Amor calling to make sure pt will remain a pt of [facility] inasmuch as pt is now in a nursing home.  Please call Amor .

## 2020-01-07 NOTE — TELEPHONE ENCOUNTER
"Returned pt call and reviewed findings.  He states pt is currently at \"[facility].\"  Reassurance given that they have a team of providers overseeing his care at this time and pt is welcome to come to us once he is discharged.  Upon chart review pt is still scheduled for FV with MD on 02/06/2020.  Reassurance given that schedulers are notified to cancel upcoming visit.  He verbalizes understanding and is appreciative of information given.    Follow up sent to schedulers to cancel appt for pt.  "

## 2020-01-15 NOTE — TELEPHONE ENCOUNTER
Noted findings.    ----------------------  Other (pt in nursing home at this time)    Jesus Sheth Ass't  You 4 hours ago (11:10 AM)      Canceled appt per request.     Have a great day!    Routing comment        You  [facility] Schedulers Pool 8 days ago      2nd request to cancel pt FV with MD on 02/06/2019 as pt is in rehab at this time.     Thank you!    Routing comment